# Patient Record
Sex: MALE | Race: OTHER | HISPANIC OR LATINO | URBAN - METROPOLITAN AREA
[De-identification: names, ages, dates, MRNs, and addresses within clinical notes are randomized per-mention and may not be internally consistent; named-entity substitution may affect disease eponyms.]

---

## 2024-06-10 ENCOUNTER — EMERGENCY (EMERGENCY)
Facility: HOSPITAL | Age: 39
LOS: 1 days | Discharge: HOME | End: 2024-06-10
Attending: STUDENT IN AN ORGANIZED HEALTH CARE EDUCATION/TRAINING PROGRAM
Payer: MEDICAID

## 2024-06-10 VITALS
DIASTOLIC BLOOD PRESSURE: 82 MMHG | OXYGEN SATURATION: 97 % | SYSTOLIC BLOOD PRESSURE: 145 MMHG | RESPIRATION RATE: 18 BRPM | TEMPERATURE: 99 F | HEART RATE: 69 BPM | WEIGHT: 114.64 LBS

## 2024-06-10 PROCEDURE — T1013: CPT

## 2024-06-10 PROCEDURE — 99284 EMERGENCY DEPT VISIT MOD MDM: CPT

## 2024-06-10 PROCEDURE — 99283 EMERGENCY DEPT VISIT LOW MDM: CPT

## 2024-06-10 RX ORDER — ACETAMINOPHEN 500 MG
975 TABLET ORAL ONCE
Refills: 0 | Status: COMPLETED | OUTPATIENT
Start: 2024-06-10 | End: 2024-06-10

## 2024-06-10 RX ORDER — OFLOXACIN 0.3 %
2 DROPS OPHTHALMIC (EYE)
Qty: 1 | Refills: 0
Start: 2024-06-10 | End: 2024-06-14

## 2024-06-10 RX ORDER — OFLOXACIN 0.3 %
2 DROPS OPHTHALMIC (EYE) ONCE
Refills: 0 | Status: COMPLETED | OUTPATIENT
Start: 2024-06-10 | End: 2024-06-10

## 2024-06-10 RX ADMIN — Medication 975 MILLIGRAM(S): at 12:17

## 2024-06-10 RX ADMIN — Medication 2 DROP(S): at 12:18

## 2024-06-10 NOTE — ED PROVIDER NOTE - PATIENT PORTAL LINK FT
You can access the FollowMyHealth Patient Portal offered by [facility] by registering at the following website: [email or web address]. By joining [employer]’s FollowMyHealth portal, you will also be able to view your health information using other applications (apps) compatible with our system.

## 2024-06-10 NOTE — ED PROVIDER NOTE - CARE PROVIDERS DIRECT ADDRESSES
[email or web address]

## 2024-06-10 NOTE — ED PROVIDER NOTE - NSFOLLOWUPINSTRUCTIONS_ED_ALL_ED_FT
Abrasión corneal  Corneal Abrasion  An eye showing the cornea and eyelid.   Yair abrasión corneal es un rasguño o lesión en el tejido transparente en la parte delantera del jennifer (córnea). Puede ser muy dolorosa. La córnea protege el jennifer y ayuda a enfocar la visión. La córnea está formada por varias capas, brock la capa superficial es moi de los tejidos más sensibles del cuerpo.    Yair abrasión corneal se lexi rápidamente cuando se trata. Si no se trata, puede infectarse y provocar yair llaga abierta (úlcera). Hanna puede causar cicatrices, y el tejido cicatricial puede afectar la visión. A veces, después de que la abrasión cicatriza, otra abrasión puede ocurrir en el mismo lugar.    ¿Cuáles son las causas?  La abrasión corneal puede deberse a lo siguiente:  Un golpe o pinchazo en el jennifer.  Yair sustancia arenosa o irritante (cuerpo extraño) en el jennifer.  Frotar demasiado los ojos.  Ojos muy secos.  Ciertas infecciones oculares.  Lentes de contacto que no se ajustan de manera adecuada o que se usan carolyn mucho tiempo. La lesión también puede ocurrir al ponerse o quitarse las lentes de contacto.  Cirugía ocular.  Ciertos problemas en la córnea que hacen que sea más probable que se produzca yair abrasión corneal.  A veces, la causa es desconocida.    ¿Cuáles son los signos o síntomas?  Los síntomas de esta afección incluyen:  Dolor ocular. El dolor puede empeorar al abrir y cerrar el jennifer, o al moverlo.  Sensación de que tiene algo que pincha o metido en el jennifer.  Lagrimeo, enrojecimiento y sensibilidad a la josh.  Dificultad para mantener los ojos abiertos o imposibilidad de mantenerlos abiertos.  Visión borrosa.  Dolor de celestino.  ¿Cómo se diagnostica?  Yair abrasión corneal se puede diagnosticar en función de los antecedentes médicos, los síntomas y un examen ocular. Es posible que deba consultar a un médico especialista en afecciones oculares (oftalmólogo u optometrista).    Antes del examen ocular, es posible que le administren gotas oftálmicas para adormecer el jennifer. También es posible que le coloquen un tinte con un gotero o con yair pequeña jef de papel. Leonora tinte ayuda al oculista a tricia mejor la lesión al usar yair josh o un oftalmoscopio (lámpara de hendidura).    ¿Cómo se trata?  El tratamiento puede variar según la causa de la abrasión corneal. Puede incluir lo siguiente:  Lavado del jennifer.  Quitar todo lo que esté atascado en el jennifer.  Usar gotas o ungüentos con antibiótico para tratar o prevenir yair infección.  Usar gotas oftálmicas que disminuyen la inflamación y el dolor.  Usar gotas o ungüentos con corticoesteroides para tratar el enrojecimiento, la irritación o la inflamación.  Aplicar un paño frío y húmedo (compresa fría) o yair compresa de hielo para aliviar el dolor.  Mahogany analgésicos. Pueden incluir medicamentos de venta zenon, desmond ibuprofeno. Si tiene yair abrasión corneal alfreda o profunda, se puede recetar un medicamento opioide.  Para las abrasiones grandes, también podría usarse un parche ocular o yair lente de contacto blanda de vendaje. Yair lente de contacto blanda de vendaje protege la córnea mientras se lexi. Esta alternativa no se utiliza si la abrasión corneal está relacionada con el uso de lentes de contacto. Hanna se debe a que es más probable que contraiga yair infección ocular.    Siga estas instrucciones en monique casa:  Medicamentos    Si le recetaron gotas o yair pomada, aplíqueselas según las indicaciones del médico. Es posible que le indiquen que use lo siguiente:  Gotas para los ojos o ungüento con antibiótico. No deje de usarlos aunque comience a sentirse mejor.  Gotas oftálmicas que humedecen el jennifer (gotas oftálmicas lubricantes).  Pregúntele al médico si el medicamento que le recetó:  Requiere que evite conducir o usar maquinaria.  Puede causarle estreñimiento. Es posible que tenga que mahogany estas medidas para prevenir o tratar el estreñimiento:  Beber suficiente líquido para mantener el pis (orina) de color amarillo pálido.  Usar medicamentos recetados o de venta zenon.  Consumir alimentos ricos en fibra, desmond frijoles, cereales integrales, y frutas y verduras frescas.  Limitar el consumo de alimentos ricos en grasa y azúcares procesados, desmond alimentos fritos o dulces.  Use los medicamentos de venta zenon y los recetados solamente desmond se lo haya indicado el médico.  Cuidado de los ojos    Descanse los ojos. Evite la josh brillante y el uso excesivo (esfuerzo) de los ojos. Use gafas de sol.  No se toque ni se frote el jennifer. No se lave el jennifer.  Pregúntele al médico si puede usar yair compresa fría en el jennifer para aliviar el dolor.  Si tiene un parche ocular:  Úselo desmond se lo haya indicado el médico.  Siga las instrucciones del médico acerca de cuándo quitárselo.  Si tiene yair lente de contacto blanda con vendaje, el médico se la quitará carolyn la visita de seguimiento.  No use tirso propias lentes de contacto hasta que el médico lo autorice.  Instrucciones generales    No conduzca ni opere maquinaria hasta que el médico lo autorice. Es posible que no pueda juzgar catherine las distancias si monique visión se ve afectada o si usa un parche ocular.  Concurra a todas las visitas de seguimiento. Hanna ayuda a prevenir la infección y la pérdida de la visión.  Comuníquese con un médico si:  Continúa con dolor ocular y otros síntomas carolyn más de 2 o 3 días.  Tiene síntomas nuevos, desmond más enrojecimiento, lagrimeo o líquido (secreción) provenientes del jennifer.  Tiene los párpados hinchados.  La visión empeora mucho.  Tiene yair secreción que le mike los ojos pegados a la mañana.  El parche ocular se afloja al punto que puede parpadear.  Los síntomas vuelven a aparecer después de que la abrasión se ha cicatrizado.  Solicite ayuda de inmediato si:  Tiene dolor intenso en el jennifer que no mejora con medicamentos.  Sufre pérdida grave de la visión.  Esta información no tiene desmond fin reemplazar el consejo del médico. Asegúrese de hacerle al médico cualquier pregunta que tenga.

## 2024-06-10 NOTE — ED PROVIDER NOTE - NS ED ROS FT
Constitutional: no fever, no chills  Head: NC  Eyes: no redness  ENMT: no nasal congestion/drainage  CV: no chest pain, no edema  Resp: no cough, no dyspnea  GI: no abdominal pain, no nausea, no vomiting, no diarrhea  : no dysuria  Skin: no lesions, no rashes  Neuro: no LOC, no headache, no sensory deficits Constitutional: no fever, no chills  Head: NC  Eyes: see hpi  ENMT: no nasal congestion/drainage  CV: no chest pain, no edema  Resp: no cough, no dyspnea  GI: no abdominal pain, no nausea, no vomiting, no diarrhea  : no dysuria  Skin: no lesions, no rashes  Neuro: no LOC, no headache, no sensory deficits

## 2024-06-10 NOTE — ED PROVIDER NOTE - ATTENDING CONTRIBUTION TO CARE
I have seen and examined this patient and fully participated in the care of this patient as the teaching attending. I personally made/approved the management plan and take responsibility for the patient management.     I have reviewed the resident's documentation. The documentation has been edited as indicated to reflect my findings. Tere Felipe MD, Attending Physician

## 2024-06-10 NOTE — ED PROVIDER NOTE - OBJECTIVE STATEMENT
39 y/o M denies PMHx who presents to the ED for R eye injury. 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 Since then he has been having pain in that eye, blurry vision, and light sensitivity. States that he went to the pharmacy and was given artificial tears however reports no improvement in symptoms. Otherwise denies any other complaints at this time.

## 2024-06-10 NOTE — ED PROVIDER NOTE - CARE PROVIDER_API CALL
[facility]  Ophthalmology  [address] [place] [address], [address]  [place] [address]  Phone: [phone]  Fax: [phone]  Follow Up Time:

## 2024-06-10 NOTE — ED ADULT TRIAGE NOTE - CHIEF COMPLAINT QUOTE
Written rx faxed to the pharmacy, Pharmacy will contact pt when medication is ready for pickup.  Mike Prado,  [employer]   Right eye pain/redness since Friday after something flew in his eye

## 2024-06-10 NOTE — ED PROVIDER NOTE - PHYSICAL EXAMINATION
General: NAD  Head: NC  EENT: few mm corneal abrasion over pupil on fluorescin eye exam   Cardiac: no lower extremity edema  Respiratory: no respiratory distress   Abdomen: ND  MSK/Vascular: warm extremities  Neuro: grossly intact  Psych: calm, cooperative General: NAD  Head: NC  EENT: few mm horizonral corneal abrasion over pupil on fluorescin eye exam, no FB visualized, conjunctiva erythematous, PERRL, EOMI  Cardiac: no lower extremity edema  Respiratory: no respiratory distress   Abdomen: ND  MSK/Vascular: warm extremities  Neuro: grossly intact  Psych: calm, cooperative

## 2024-06-10 NOTE — ED PROVIDER NOTE - CLINICAL SUMMARY MEDICAL DECISION MAKING FREE TEXT BOX
37 y/o M denies PMHx who presents to the ED for R eye injury. Small few mm corneal abrasion over pupil on R eye, will tx pain, send ofloxacin eye drops to patients pharmacy, and dc with Opthalmology f/u and return precautions. 37 y/o M denies PMHx who presents to the ED for R eye injury. Small few mm corneal abrasion over pupil on R eye, will tx pain, send ofloxacin eye drops to patients pharmacy (does not wear contacts), and dc with Opthalmology f/u and return precautions.